# Patient Record
Sex: FEMALE
[De-identification: names, ages, dates, MRNs, and addresses within clinical notes are randomized per-mention and may not be internally consistent; named-entity substitution may affect disease eponyms.]

---

## 2023-07-03 PROBLEM — Z00.00 ENCOUNTER FOR PREVENTIVE HEALTH EXAMINATION: Status: ACTIVE | Noted: 2023-07-03

## 2023-07-17 ENCOUNTER — APPOINTMENT (OUTPATIENT)
Dept: SURGERY | Facility: CLINIC | Age: 38
End: 2023-07-17
Payer: COMMERCIAL

## 2023-07-17 VITALS
HEART RATE: 60 BPM | HEIGHT: 61 IN | WEIGHT: 167 LBS | SYSTOLIC BLOOD PRESSURE: 111 MMHG | BODY MASS INDEX: 31.53 KG/M2 | DIASTOLIC BLOOD PRESSURE: 77 MMHG

## 2023-07-17 PROCEDURE — 99203 OFFICE O/P NEW LOW 30 MIN: CPT

## 2023-07-17 NOTE — PLAN
[FreeTextEntry1] : Ms. SLOORZANO  is presenting  today for an evaluation .  she is doing well and offers no complaints.  Results of  her recent  imaging and physical examination findings were discussed in details.   She  was advised to have             Left   breast US and Mammogram in  December and return after the tests. Importance of monthly self-breast examination was reinforced.  Patient's questions and concerns addressed to patient's satisfaction.\par \par

## 2023-07-17 NOTE — CONSULT LETTER
[Dear  ___] : Dear  [unfilled], [Consult Letter:] : I had the pleasure of evaluating your patient, [unfilled]. [Please see my note below.] : Please see my note below. [Consult Closing:] : Thank you very much for allowing me to participate in the care of this patient.  If you have any questions, please do not hesitate to contact me. [Sincerely,] : Sincerely, [FreeTextEntry3] : Hosea Damon MD, FACS

## 2023-07-17 NOTE — DATA REVIEWED
[FreeTextEntry1] : Patient Name\par UMAIR SOLORZANO\par Date of Birth\par 1985\par Procedure\par US DIAGNOSTIC BREAST BILATERAL\par Study Date & Time\par 2023-06-03 12:02 PM\par Patient ID\par [id number]\par Accession Number\par [id number]\par Referring Physician\par DAVE MADSEN\par Institution Name\par [facility]\par Report\par RADIOLOGY REPORT\par FINDINGS\par FINDING\par Indication: Patient is 38 years old for evaluation of asymmetry seen in the lateral aspect of the\par left breast on screening mammogram. The patient has no personal or family history of breast cancer.\par Date of last clinical exam: Within the past year.\par Breast Cancer Risk: NCI 5 year:0.4%; NCI Lifetime:6.7%\par Comparison: 5/9/2023.\par 3D digital CC and MLO spot and ML views of the left breast was obtained. Reconstructed CC and MLO\par spot and ML views were obtained. This study was interpreted in conjunction with a computer aided\par detection system.\par Findings:\par The breast tissue is heterogeneously dense, which may obscure small masses. The area of distortion\par does not persist on the additional imaging. There is suggestion of a 6 mm nodule in the lateral\par aspect of the left breast.\par Bilateral breast ultrasound:\par Evaluation of both breast and axilla was performed around the clock using a high resolution linear\par array transducer.\par Comparison: None available.\par There is a heterogeneous echo pattern of the glandular tissue.\par Right breast:\par 9:00, 6 cm from the nipple, 6 mm cyst is present.\par Axilla, benign-appearing lymph node is present.\par Left breast:\par 1:00, 3 cm from the nipple, simple 6 mm cyst is present.\par  \par 3:00, 8 cm from the nipple, 5 mm isoechoic finding is present, which may represent a lymph node\par versus a nodule. This may or may not correspond with finding as seen on mammogram.\par Axilla, no significant lymph node is present.\par Impression:\par There is slight improvement of nodule in the lateral aspect of the left breast. Follow-up 3-D\par diagnostic left mammogram in 6 months is recommended to further document stability.\par Probably benign finding seen in the 3:00 location of the left breast on ultrasound which may or may\par not correspond with finding as seen on mammogram. Targeted diagnostic left breast ultrasound in 6\par months is recommended to further document stability.\par Bilateral subcentimeter incidental cysts are also seen, not clinically significant. Continued annual\par sonographic surveillance at time of screening mammogram is recommended given the background\par heterogeneously dense glandular pattern. Further evaluation of clinical symptoms should be based on\par clinical grounds.\par BI-RADS CATEGORY: 3 - Probably Benign\par Recommendation: Follow up diagnostic mammogram and US in 6 months\par A letter will be sent to the patient with the above recommendations.\par Approximately 10% of breast carcinomas are not radiographically detectable. A negative report should\par not delay biopsy if a clinically suspicious mass is present. Dense breasts may obscure an underlying\par neoplasm.\par Patient has been added into a reminder system with a target due date for their next mammogram.\par BIRADS 1: Negative\par BIRADS 2: Benign\par BIRADS 3: Probably Benign Finding. Short interval follow up suggested.\par BIRADS 4: Suspicious Abnormality. Biopsy recommended.\par BIRADS 5: Highly Suggestive of Malignancy. Appropriate action should be taken.\par BIRADS 6: Known Biopsy Proven Malignancy. Appropriate action should be taken.\par BIRADS 0: Incomplete - Need Additional Imaging Evaluation and/or Prior Mammograms for Comparison\par The [place] Department of Health requires us to indicate the date of the patient's last\par clinical breast examination.\par Electronically signed by: Prieto Gay MD 6/3/2023 12:33 PM\par Workstation: [id number]\par Electronically Signed By: Prieto Gay MD\par Sign Date: 03-JUN-23[facility] Radiology

## 2023-07-17 NOTE — HISTORY OF PRESENT ILLNESS
[de-identified] : Patient is a 38 year   -old female  who was referred by Tali Fuller  with the chief complaint of having a Left   breast mass.   She  denies any trauma and She has no nipple discharge. She  denies any fever, night sweats or loss of appetite.    There is no family history of breast carcinoma.   Menarche at age 13 -1 para-1 and her last menstrual period was on . Patient is on no hormonal replacement therapy.  \par  Patient  had a BL   mammogram on 2023 that was deemed a BIRADS 0. \par  Patient  had a BL breast US and Left diagnostic  mammogram on 2023 that was deemed a BIRADS 3.     [de-identified] :  \par

## 2023-07-17 NOTE — PHYSICAL EXAM
[Alert] : alert [Oriented to Person] : oriented to person [Oriented to Place] : oriented to place [Oriented to Time] : oriented to time [Calm] : calm [de-identified] : She  is alert, well-groomed, and in NAD\par   [de-identified] : anicteric.  Nasal mucosa pink, septum midline. Oral mucosa pink.  Tongue midline, Pharynx without exudates.\par   [de-identified] : Neck supple. Trachea midline. Thyroid isthmus barely palpable, lobes not felt.\par   [de-identified] : No chest deformity. Breast are symmetric, Normal contours. No nodules, masses, tenderness, or axillary or supraclavicular  adenopathy. No nipple discharge. no skin retraction

## 2024-02-14 PROBLEM — R92.8 ABNORMAL ULTRASOUND OF BREAST: Status: ACTIVE | Noted: 2023-07-17

## 2024-02-14 PROBLEM — R92.8 ABNORMAL MAMMOGRAPHY: Status: ACTIVE | Noted: 2024-02-14

## 2024-02-15 ENCOUNTER — APPOINTMENT (OUTPATIENT)
Dept: SURGERY | Facility: CLINIC | Age: 39
End: 2024-02-15
Payer: COMMERCIAL

## 2024-02-15 VITALS
HEIGHT: 61 IN | HEART RATE: 66 BPM | DIASTOLIC BLOOD PRESSURE: 75 MMHG | WEIGHT: 166 LBS | SYSTOLIC BLOOD PRESSURE: 122 MMHG | BODY MASS INDEX: 31.34 KG/M2

## 2024-02-15 DIAGNOSIS — R92.8 OTHER ABNORMAL AND INCONCLUSIVE FINDINGS ON DIAGNOSTIC IMAGING OF BREAST: ICD-10-CM

## 2024-02-15 PROCEDURE — 99213 OFFICE O/P EST LOW 20 MIN: CPT

## 2024-02-15 NOTE — HISTORY OF PRESENT ILLNESS
[de-identified] :  This is  a 38 year   old patient presenting today for a breast exam and to discuss the results of her  breast imaging.   Patient had a BL mammogram on 2023 that was deemed a BIRADS 0.   Patient had a BL breast US and Left diagnostic mammogram on 2023 that was deemed a BIRADS 3. Ms. SOLORZANO  is s/p  diagnostic left breast US and mammogram on 2024 that was deemed BIRADS 3. Patient reports no interval changes to her overall health status or medical history  .  Ms. SOLORZANO denies any trauma to the breast, denies  skin changes  and   she has no spontaneous nipple discharge. Patient denies any fever, night sweats or loss of appetite.    Date of her last menstrual period:  January 15.        Patient is on no hormonal replacement therapy.    PERTINENT HISTORY:  There is no family history of breast carcinoma. Menarche at age 13 -1 para-1

## 2024-02-15 NOTE — PLAN
[FreeTextEntry1] : Ms. SOLORZANO  is presenting  today for an evaluation .  she is doing well and offers no complaints.  Results of  her last   imaging and physical examination findings were discussed in details.  Significance of the findings were discussed.    She  was advised to have  BL    breast US and Mammogram in  July and return after the tests. Importance of monthly self-breast examination was reinforced.  Patient's questions and concerns addressed to patient's satisfaction.       Vitamin E daily for breast pain.  Avoid caffein and Chocolates to prevent breast pain NSAIDs PRN for pain

## 2024-02-15 NOTE — PHYSICAL EXAM
[Alert] : alert [Oriented to Person] : oriented to person [Oriented to Place] : oriented to place [Oriented to Time] : oriented to time [Calm] : calm [de-identified] : She  is alert, well-groomed, and in NAD\par    [de-identified] : anicteric.  Nasal mucosa pink, septum midline. Oral mucosa pink.  Tongue midline, Pharynx without exudates.\par    [de-identified] : Neck supple. Trachea midline. Thyroid isthmus barely palpable, lobes not felt.\par    [de-identified] : No chest deformity. Breast are symmetric, Normal contours. No nodules, masses, tenderness, or axillary or supraclavicular  adenopathy. No nipple discharge. no skin retraction

## 2024-02-15 NOTE — DATA REVIEWED
[FreeTextEntry1] : Patient Name UMAIR SOLORZANO Date of Birth 1985 Procedure MA 3D DIGITAL DIAGNOSTIC MAMMOGRAPHY (LE Study Date & Time 2024-01-25 3:23 PM Patient ID [id number] Accession Number [id number] Referring Physician DAVE MADSEN Institution Name [id number] Report RADIOLOGY REPORT FINDINGS FINDING Indication: Patient is 38 years old and is seen for diagnostic evaluation. Follow-up nodule in the lateral left breast on mammography and ultrasound. The patient has no personal history of breast cancer. The patient has no family history of breast cancer. Last reported clinical breast exam: Within the past year Breast Cancer Risk: NCI 5 year:0.4% NCI Lifetime:6.7% Comparison: Prior mammograms dating back to 5/9/2023 and bilateral breast ultrasound dated 6/3/2023. 3D digital CC and MLO views of the left breast were obtained. Reconstructed CC and MLO views were obtained. This study was interpreted in conjunction with a computer aided detection system. Findings: The breast tissue is heterogeneously dense, which may obscure small masses. There is a stable 0.6 cm oval nodule in the lateral left breast at a posterior depth. No suspicious microcalcifications or areas of architectural distortion are identified. The left axillary region is unremarkable. Targeted evaluation of the 3:00 position of the left breast was performed utilizing a high-resolution linear transducer. 3:00, 5 cm from the nipple, stable 0.5 cm oval circumscribed hypoechoic nodule (previously labeled 3:00, 8 cm from the nipple). Impression: Stable probably benign finding at the 3:00 position of the left breast on mammography and ultrasound. Follow-up bilateral diagnostic mammogram and breast ultrasound is recommended in 6 months. BI-RADS CATEGORY: 3 - Probably Benign Recommendation: Follow up diagnostic mammogram and US in 6 months. A letter will been sent to the patient with the above recommendations. Approximately 10% of breast carcinomas are not radiographically detectable. A negative report should not delay biopsy if a clinically suspicious mass is present. Dense breasts may obscure an underlying neoplasm. Patient has been added into a reminder system with a target due date for their next mammogram. BIRADS 1: Negative BIRADS 2: Benign BIRADS 3: Probably Benign BIRADS 4: Suspicious Abnormality - Biopsy should be considered. BIRADS 4a: Low Suspicion of Malignancy BIRADS 4b: Intermediate Suspicion of Malignancy BIRADS 4c: Moderately Suspicious of Malignancy BIRADS 5: Highly Suspicious of Malignancy BIRADS 6: Known Malignancy BIRADS 0: Incomplete - Need Additional Imaging Evaluation and/or Prior Mammograms for Comparison The [place] Department of Health requires us to indicate the date of the patient's last clinical breast examination. If NCI risk has been calculated, the results are based on information provided by the patient. Electronically signed by: Kayla Grace MD 1/25/2024 4:01 PM Workstation: [email or web address] Electronically Signed By: Kayla Grace MD Sign Date: 25-JAN-24 [facility]

## 2024-07-22 ENCOUNTER — APPOINTMENT (OUTPATIENT)
Dept: SURGERY | Facility: CLINIC | Age: 39
End: 2024-07-22
Payer: COMMERCIAL

## 2024-07-22 VITALS
HEART RATE: 61 BPM | WEIGHT: 167 LBS | SYSTOLIC BLOOD PRESSURE: 119 MMHG | BODY MASS INDEX: 31.53 KG/M2 | OXYGEN SATURATION: 100 % | HEIGHT: 61 IN | DIASTOLIC BLOOD PRESSURE: 81 MMHG

## 2024-07-22 DIAGNOSIS — R92.8 OTHER ABNORMAL AND INCONCLUSIVE FINDINGS ON DIAGNOSTIC IMAGING OF BREAST: ICD-10-CM

## 2024-07-22 PROCEDURE — 99213 OFFICE O/P EST LOW 20 MIN: CPT

## 2024-07-22 NOTE — DATA REVIEWED
[FreeTextEntry1] : Patient Name UMAIR SOLORZANO Date of Birth 1985 Procedure MA 3D DIGITAL DIAGNOSTIC MAMMOGRAPHY Study Date & Time 2024-07-11 4:57 PM Patient ID [id number] Accession Number [id number] Referring Physician DAPHNEY PERAZA Institution Name [facility] Report RADIOLOGY REPORT FINDINGS FINDING Indication: Patient is 39 years old and is seen for diagnostic evaluation of year routine and follow-up for problem/biopsy in the left breast. The patient has no personal history of breast cancer. The patient has no family history of breast cancer. Date of last clinical exam: . Breast cancer risk: NCI 5 year:0.4% NCI Lifetime:6.6% Comparison: 05/09/2023 (mammogram), 06/03/2023 (mammogram) and 01/25/2024 (mammogram). Views obtained: Bilateral 3-D tomosynthesis MLO/CC views. Digital mammography was performed with additional image analysis utilizing CAD. The breast tissue is heterogeneously dense, which may obscure small masses. Patchy parenchymal pattern appears stable. No dominant masses, significant calcifications or areas of architectural distortion are identified. Breast ultrasound: Comparison: 1/25/2024 and 6/3/2023 Both breasts were scanned in round the clock fashion including the retroareolar and axillary regions. The tissues are homogeneous. Right breast: 8:00, 4 cm from nipple; 3 mm probable debris-containing cyst. 11:00, periareolar region; 8 mm benign-appearing cluster of microcysts. Retroareolar region; minimally dilated ducts; no intraductal nodules identified. Left breast: 1:00, 3 cm from nipple; 4 mm benign-appearing debris-containing cyst  IMPRESSION: Benign-appearing cluster of microcysts, right breast, 11:00, periareolar region and probable debris-containing cyst, right breast, 8:00 and left breast, 1:00. Six-month follow-up ultrasound is recommended to ensure stability. No mammographic evidence of malignancy . Mammogram BI-RADS CATEGORY: 2 - Benign Ultrasound BI-RADS 3 Recommendation: Follow up US in 6 months . A letter has been sent to the patient with the above recommendations. Approximately 10% of breast carcinomas are not radiographically detectable. A negative report should not delay biopsy if a clinically suspicious mass is present. Dense breasts may obscure an underlying neoplasm. BIRADS 0: Incomplete - Need Additional Imaging Evaluation and/or Prior Mammograms for Comparison BIRADS 1: Negative BIRADS 2: Benign BIRADS 3: Probably Benign BIRADS 4a: Low Suspicion of Malignancy BIRADS 4b: Intermediate Suspicion of Malignancy BIRADS 4c: Moderately Suspicious of Malignancy BIRADS 5: Highly Suspicious of Malignancy BIRADS 6: Known Malignancy The [place] Department of Health requires us to indicate the date of the patient's last clinical breast examination. Electronically signed by: Anny Basilio MD 7/11/2024 5:47 PM Workstation: [id number] Electronically Signed By: Anny Basilio MD Sign Date: 11-JUL-24 [facility]

## 2024-07-22 NOTE — HISTORY OF PRESENT ILLNESS
[de-identified] : This is  a 39 year   old patient presenting today for a breast exam and to discuss the results of her recent  breast imaging. Patient had a BL breast US and   BL breast Mammogram on 2024 .  Mammogram was deemed BI-RADS CATEGORY: 2 - Benign and Ultrasound BI-RADS 3.  US showed benign-appearing cluster of microcysts, right breast, 11:00, periareolar region and probable debris-containing cyst, right breast, 8:00 and left breast, 1:00.  Patient reports no interval changes to her overall health status or medical history.    Ms. SOLORZANO denies any trauma to the breast, denies  skin changes  and   she has no spontaneous nipple discharge. Patient denies any fever, night sweats or loss of appetite.    Date of her last menstrual period:  Now .       There is no family history of breast carcinoma.   Patient is on no hormonal replacement therapy.   PERTINENT HISTORY: There is no family history of breast carcinoma. Menarche at age 13 -1 para-1.

## 2024-07-22 NOTE — PHYSICAL EXAM
[Alert] : alert [Oriented to Person] : oriented to person [Oriented to Place] : oriented to place [Oriented to Time] : oriented to time [Calm] : calm [de-identified] : She  is alert, well-groomed, and in NAD\par    [de-identified] : anicteric.  Nasal mucosa pink, septum midline. Oral mucosa pink.  Tongue midline, Pharynx without exudates.\par    [de-identified] : Neck supple. Trachea midline. Thyroid isthmus barely palpable, lobes not felt.\par    [de-identified] : No chest deformity. Breast are symmetric, Normal contours. No nodules, masses, tenderness, or axillary or supraclavicular  adenopathy. No nipple discharge. no skin retraction

## 2024-07-22 NOTE — PLAN
[FreeTextEntry1] : Ms. SOLORZANO  is presenting  today for an evaluation .  she is doing well and offers no complaints.  Results of  her last   imaging and physical examination findings were discussed in details.  Significance of the findings were discussed.    She  was advised to have  BL    breast US   in  January 2025 and return after the tests. Importance of monthly self-breast examination was reinforced.  Patient's questions and concerns addressed to patient's satisfaction.

## 2025-02-12 ENCOUNTER — NON-APPOINTMENT (OUTPATIENT)
Age: 40
End: 2025-02-12

## 2025-02-27 ENCOUNTER — APPOINTMENT (OUTPATIENT)
Dept: SURGERY | Facility: CLINIC | Age: 40
End: 2025-02-27
Payer: COMMERCIAL

## 2025-02-27 ENCOUNTER — NON-APPOINTMENT (OUTPATIENT)
Age: 40
End: 2025-02-27

## 2025-02-27 VITALS
OXYGEN SATURATION: 98 % | WEIGHT: 162 LBS | SYSTOLIC BLOOD PRESSURE: 101 MMHG | DIASTOLIC BLOOD PRESSURE: 61 MMHG | BODY MASS INDEX: 30.58 KG/M2 | HEIGHT: 61 IN | HEART RATE: 75 BPM

## 2025-02-27 DIAGNOSIS — Z12.39 ENCOUNTER FOR OTHER SCREENING FOR MALIGNANT NEOPLASM OF BREAST: ICD-10-CM

## 2025-02-27 DIAGNOSIS — R92.8 OTHER ABNORMAL AND INCONCLUSIVE FINDINGS ON DIAGNOSTIC IMAGING OF BREAST: ICD-10-CM

## 2025-02-27 PROCEDURE — 99213 OFFICE O/P EST LOW 20 MIN: CPT

## 2025-09-11 ENCOUNTER — APPOINTMENT (OUTPATIENT)
Dept: SURGERY | Facility: CLINIC | Age: 40
End: 2025-09-11
Payer: COMMERCIAL

## 2025-09-11 VITALS
WEIGHT: 172 LBS | OXYGEN SATURATION: 98 % | DIASTOLIC BLOOD PRESSURE: 77 MMHG | HEIGHT: 61 IN | HEART RATE: 64 BPM | BODY MASS INDEX: 32.47 KG/M2 | SYSTOLIC BLOOD PRESSURE: 113 MMHG

## 2025-09-11 DIAGNOSIS — Z12.39 ENCOUNTER FOR OTHER SCREENING FOR MALIGNANT NEOPLASM OF BREAST: ICD-10-CM

## 2025-09-11 PROCEDURE — 99213 OFFICE O/P EST LOW 20 MIN: CPT
